# Patient Record
Sex: MALE | URBAN - METROPOLITAN AREA
[De-identification: names, ages, dates, MRNs, and addresses within clinical notes are randomized per-mention and may not be internally consistent; named-entity substitution may affect disease eponyms.]

---

## 2020-06-22 ENCOUNTER — OFFICE VISIT (OUTPATIENT)
Dept: URBAN - METROPOLITAN AREA CLINIC 105 | Facility: CLINIC | Age: 70
End: 2020-06-22
Payer: COMMERCIAL

## 2020-06-22 ENCOUNTER — LAB OUTSIDE AN ENCOUNTER (OUTPATIENT)
Dept: URBAN - METROPOLITAN AREA CLINIC 105 | Facility: CLINIC | Age: 70
End: 2020-06-22

## 2020-06-22 DIAGNOSIS — R10.13 DYSPEPSIA: ICD-10-CM

## 2020-06-22 DIAGNOSIS — Z12.11 SCREEN FOR COLON CANCER: ICD-10-CM

## 2020-06-22 DIAGNOSIS — R63.4 WEIGHT LOSS: ICD-10-CM

## 2020-06-22 DIAGNOSIS — E11.9 TYPE 2 DIABETES MELLITUS WITHOUT COMPLICATION, WITHOUT LONG-TERM CURRENT USE OF INSULIN: ICD-10-CM

## 2020-06-22 PROCEDURE — G9906 PT RECV TBCO CESS INTERV: HCPCS | Performed by: INTERNAL MEDICINE

## 2020-06-22 PROCEDURE — G9902 PT SCRN TBCO AND ID AS USER: HCPCS | Performed by: INTERNAL MEDICINE

## 2020-06-22 PROCEDURE — G8753 SYS BP > OR = 140: HCPCS | Performed by: INTERNAL MEDICINE

## 2020-06-22 PROCEDURE — 3017F COLORECTAL CA SCREEN DOC REV: CPT | Performed by: INTERNAL MEDICINE

## 2020-06-22 PROCEDURE — G8420 CALC BMI NORM PARAMETERS: HCPCS | Performed by: INTERNAL MEDICINE

## 2020-06-22 PROCEDURE — G8427 DOCREV CUR MEDS BY ELIG CLIN: HCPCS | Performed by: INTERNAL MEDICINE

## 2020-06-22 PROCEDURE — 99204 OFFICE O/P NEW MOD 45 MIN: CPT | Performed by: INTERNAL MEDICINE

## 2020-06-22 PROCEDURE — 99244 OFF/OP CNSLTJ NEW/EST MOD 40: CPT | Performed by: INTERNAL MEDICINE

## 2020-06-22 PROCEDURE — 4004F PT TOBACCO SCREEN RCVD TLK: CPT | Performed by: INTERNAL MEDICINE

## 2020-06-22 RX ORDER — LISINOPRIL 5 MG/1
1 TABLET TABLET ORAL ONCE A DAY
Status: ACTIVE | COMMUNITY

## 2020-06-22 RX ORDER — AMLODIPINE BESYLATE 5 MG/1
1 TABLET TABLET ORAL ONCE A DAY
Status: ACTIVE | COMMUNITY

## 2020-06-22 RX ORDER — TAMSULOSIN HYDROCHLORIDE 0.4 MG/1
1 CAPSULE CAPSULE ORAL ONCE A DAY
Status: ACTIVE | COMMUNITY

## 2020-06-22 NOTE — EXAM-PHYSICAL EXAM
Gen: Alert, oriented, in no distress Heent: no icterus, lips wnl Lungs: bilateral breath sounds CVS: first and second heart sounds Abdomen: full, soft, non tender Ext: no edema Joints: normal joints and symmetry Spine: consistent with age Skin: no rash on exposed areas Neuro: no focal localizing signs

## 2020-06-22 NOTE — HPI-TODAY'S VISIT:
69 yo male here for stomach issues. He is here with his brother. Says "I sometimes hear the noise from the upper stomach". "I also want annual check up". He has no abdominal pain, nausea or vomiting.  His brother thinks he has lost 5-10 lbs over the past year.  He says " I eat". Brother "not like before". He does not think his appetite is poor. He has formed stools daily, no constipation, no blood in stool.  He has never had an EGD or colonoscopy.

## 2020-06-23 LAB
A/G RATIO: 2.2
ALBUMIN: 4.4
ALKALINE PHOSPHATASE: 73
ALT (SGPT): 20
AST (SGOT): 18
BASO (ABSOLUTE): 0.1
BASOS: 1
BILIRUBIN, TOTAL: 0.3
BUN/CREATININE RATIO: 20
BUN: 17
CALCIUM: 9.9
CARBON DIOXIDE, TOTAL: 23
CHLORIDE: 102
CREATININE: 0.85
EGFR IF AFRICN AM: 101
EGFR IF NONAFRICN AM: 88
EOS (ABSOLUTE): 0.4
EOS: 5
GLOBULIN, TOTAL: 2
GLUCOSE: 172
HEMATOCRIT: 43.3
HEMATOLOGY COMMENTS:: (no result)
HEMOGLOBIN: 14.1
IMMATURE CELLS: (no result)
IMMATURE GRANS (ABS): 0
IMMATURE GRANULOCYTES: 0
LYMPHS (ABSOLUTE): 1.9
LYMPHS: 26
MCH: 30.9
MCHC: 32.6
MCV: 95
MONOCYTES(ABSOLUTE): 0.7
MONOCYTES: 9
NEUTROPHILS (ABSOLUTE): 4.5
NEUTROPHILS: 59
NRBC: (no result)
PLATELETS: 244
POTASSIUM: 4.2
PROTEIN, TOTAL: 6.4
RBC: 4.57
RDW: 15
SODIUM: 140
WBC: 7.5

## 2020-07-01 ENCOUNTER — TELEPHONE ENCOUNTER (OUTPATIENT)
Dept: URBAN - METROPOLITAN AREA CLINIC 105 | Facility: CLINIC | Age: 70
End: 2020-07-01

## 2020-07-01 ENCOUNTER — OFFICE VISIT (OUTPATIENT)
Dept: URBAN - METROPOLITAN AREA MEDICAL CENTER 33 | Facility: MEDICAL CENTER | Age: 70
End: 2020-07-01
Payer: COMMERCIAL

## 2020-07-01 DIAGNOSIS — K63.89 BACTERIAL OVERGROWTH SYNDROME: ICD-10-CM

## 2020-07-01 DIAGNOSIS — K29.60 ADENOPAPILLOMATOSIS GASTRICA: ICD-10-CM

## 2020-07-01 DIAGNOSIS — Z12.11 COLON CANCER SCREENING: ICD-10-CM

## 2020-07-01 DIAGNOSIS — B96.81 BACTERIAL INFECTION DUE TO H. PYLORI: ICD-10-CM

## 2020-07-01 DIAGNOSIS — D12.5 ADENOMATOUS POLYP OF SIGMOID COLON: ICD-10-CM

## 2020-07-01 DIAGNOSIS — D12.3 ADENOMA OF TRANSVERSE COLON: ICD-10-CM

## 2020-07-01 PROCEDURE — 43239 EGD BIOPSY SINGLE/MULTIPLE: CPT | Performed by: INTERNAL MEDICINE

## 2020-07-01 PROCEDURE — 45385 COLONOSCOPY W/LESION REMOVAL: CPT | Performed by: INTERNAL MEDICINE

## 2020-07-01 PROCEDURE — G9933 CANC DETECTD DURING COL SCRN: HCPCS | Performed by: INTERNAL MEDICINE

## 2020-07-01 RX ORDER — OMEPRAZOLE 40 MG/1
1 CAPSULE 30 MINUTES BEFORE MORNING MEAL CAPSULE, DELAYED RELEASE ORAL ONCE A DAY
Qty: 90 | Refills: 0 | OUTPATIENT
Start: 2020-07-01

## 2020-07-01 RX ORDER — AMLODIPINE BESYLATE 5 MG/1
1 TABLET TABLET ORAL ONCE A DAY
Status: ACTIVE | COMMUNITY

## 2020-07-01 RX ORDER — LISINOPRIL 5 MG/1
1 TABLET TABLET ORAL ONCE A DAY
Status: ACTIVE | COMMUNITY

## 2020-07-01 RX ORDER — TAMSULOSIN HYDROCHLORIDE 0.4 MG/1
1 CAPSULE CAPSULE ORAL ONCE A DAY
Status: ACTIVE | COMMUNITY

## 2020-07-13 ENCOUNTER — OFFICE VISIT (OUTPATIENT)
Dept: URBAN - METROPOLITAN AREA CLINIC 105 | Facility: CLINIC | Age: 70
End: 2020-07-13
Payer: COMMERCIAL

## 2020-07-13 DIAGNOSIS — I10 ESSENTIAL HYPERTENSION: ICD-10-CM

## 2020-07-13 DIAGNOSIS — E11.9 TYPE 2 DIABETES MELLITUS WITHOUT COMPLICATION, WITHOUT LONG-TERM CURRENT USE OF INSULIN: ICD-10-CM

## 2020-07-13 DIAGNOSIS — Z12.11 SCREEN FOR COLON CANCER: ICD-10-CM

## 2020-07-13 DIAGNOSIS — D12.6 COLON ADENOMA: ICD-10-CM

## 2020-07-13 DIAGNOSIS — R63.4 WEIGHT LOSS: ICD-10-CM

## 2020-07-13 DIAGNOSIS — B96.81 HELICOBACTER PYLORI [H. PYLORI] AS THE CAUSE OF DISEASES CLASSIFIED ELSEWHERE: ICD-10-CM

## 2020-07-13 DIAGNOSIS — K27.9 PUD (PEPTIC ULCER DISEASE): ICD-10-CM

## 2020-07-13 DIAGNOSIS — E78.5 HYPERLIPIDEMIA, UNSPECIFIED HYPERLIPIDEMIA TYPE: ICD-10-CM

## 2020-07-13 DIAGNOSIS — K29.70 GASTRITIS, UNSPECIFIED, WITHOUT BLEEDING: ICD-10-CM

## 2020-07-13 DIAGNOSIS — R10.13 DYSPEPSIA: ICD-10-CM

## 2020-07-13 PROCEDURE — G8752 SYS BP LESS 140: HCPCS | Performed by: INTERNAL MEDICINE

## 2020-07-13 PROCEDURE — G8420 CALC BMI NORM PARAMETERS: HCPCS | Performed by: INTERNAL MEDICINE

## 2020-07-13 PROCEDURE — 4004F PT TOBACCO SCREEN RCVD TLK: CPT | Performed by: INTERNAL MEDICINE

## 2020-07-13 PROCEDURE — 3017F COLORECTAL CA SCREEN DOC REV: CPT | Performed by: INTERNAL MEDICINE

## 2020-07-13 PROCEDURE — 99214 OFFICE O/P EST MOD 30 MIN: CPT | Performed by: INTERNAL MEDICINE

## 2020-07-13 PROCEDURE — G8427 DOCREV CUR MEDS BY ELIG CLIN: HCPCS | Performed by: INTERNAL MEDICINE

## 2020-07-13 RX ORDER — AMLODIPINE BESYLATE 5 MG/1
1 TABLET TABLET ORAL ONCE A DAY
Status: ON HOLD | COMMUNITY

## 2020-07-13 RX ORDER — TAMSULOSIN HYDROCHLORIDE 0.4 MG/1
1 CAPSULE CAPSULE ORAL ONCE A DAY
Status: ACTIVE | COMMUNITY

## 2020-07-13 RX ORDER — OMEPRAZOLE 40 MG/1
1 CAPSULE 30 MINUTES BEFORE MORNING MEAL CAPSULE, DELAYED RELEASE ORAL ONCE A DAY
Qty: 90 | Refills: 0 | Status: ACTIVE | COMMUNITY
Start: 2020-07-01

## 2020-07-13 RX ORDER — LISINOPRIL 5 MG/1
1 TABLET TABLET ORAL ONCE A DAY
Status: ACTIVE | COMMUNITY

## 2020-07-13 NOTE — HPI-TODAY'S VISIT:
71 yo male here for stomach issues. He is here with his brother. Says "I sometimes hear the noise from the upper stomach". "I also want annual check up". He has no abdominal pain, nausea or vomiting.  His brother thinks he has lost 5-10 lbs over the past year.  He says " I eat". Brother "not like before". He does not think his appetite is poor. He has formed stools daily, no constipation, no blood in stool.  He has never had an EGD or colonoscopy.   7/13/20 Here with daughter in law who brought him in for his endoscopies. Discussed results of EGD and colonoscopy results and answered questions.  He is feeling much better on Omeprazole daily. He takes ASA daily.

## 2020-10-12 ENCOUNTER — OFFICE VISIT (OUTPATIENT)
Dept: URBAN - METROPOLITAN AREA CLINIC 105 | Facility: CLINIC | Age: 70
End: 2020-10-12
Payer: COMMERCIAL

## 2020-10-12 ENCOUNTER — LAB OUTSIDE AN ENCOUNTER (OUTPATIENT)
Dept: URBAN - METROPOLITAN AREA CLINIC 105 | Facility: CLINIC | Age: 70
End: 2020-10-12

## 2020-10-12 ENCOUNTER — DASHBOARD ENCOUNTERS (OUTPATIENT)
Age: 70
End: 2020-10-12

## 2020-10-12 DIAGNOSIS — I10 ESSENTIAL HYPERTENSION: ICD-10-CM

## 2020-10-12 DIAGNOSIS — R10.13 DYSPEPSIA: ICD-10-CM

## 2020-10-12 DIAGNOSIS — F17.200 SMOKER: ICD-10-CM

## 2020-10-12 DIAGNOSIS — E11.9 TYPE 2 DIABETES MELLITUS WITHOUT COMPLICATION, WITHOUT LONG-TERM CURRENT USE OF INSULIN: ICD-10-CM

## 2020-10-12 DIAGNOSIS — E78.5 HYPERLIPIDEMIA, UNSPECIFIED HYPERLIPIDEMIA TYPE: ICD-10-CM

## 2020-10-12 DIAGNOSIS — K27.9 PUD (PEPTIC ULCER DISEASE): ICD-10-CM

## 2020-10-12 DIAGNOSIS — D12.6 COLON ADENOMA: ICD-10-CM

## 2020-10-12 DIAGNOSIS — R63.4 WEIGHT LOSS: ICD-10-CM

## 2020-10-12 DIAGNOSIS — B96.81 HELICOBACTER PYLORI [H. PYLORI] AS THE CAUSE OF DISEASES CLASSIFIED ELSEWHERE: ICD-10-CM

## 2020-10-12 DIAGNOSIS — K29.70 GASTRITIS, UNSPECIFIED, WITHOUT BLEEDING: ICD-10-CM

## 2020-10-12 PROBLEM — 59621000: Status: ACTIVE | Noted: 2020-06-22

## 2020-10-12 PROBLEM — 313436004: Status: ACTIVE | Noted: 2020-06-22

## 2020-10-12 PROBLEM — 89362005: Status: ACTIVE | Noted: 2020-07-13

## 2020-10-12 PROBLEM — 55822004: Status: ACTIVE | Noted: 2020-06-22

## 2020-10-12 PROBLEM — 89538001: Status: ACTIVE | Noted: 2020-07-13

## 2020-10-12 PROBLEM — 77176002: Status: ACTIVE | Noted: 2020-10-12

## 2020-10-12 PROBLEM — 305058001: Status: ACTIVE | Noted: 2020-06-22

## 2020-10-12 PROBLEM — 6185008: Status: ACTIVE | Noted: 2020-07-13

## 2020-10-12 PROBLEM — 162031009: Status: ACTIVE | Noted: 2020-06-22

## 2020-10-12 PROBLEM — 13200003: Status: ACTIVE | Noted: 2020-07-13

## 2020-10-12 PROCEDURE — 99213 OFFICE O/P EST LOW 20 MIN: CPT | Performed by: INTERNAL MEDICINE

## 2020-10-12 RX ORDER — METFORMIN HYDROCHLORIDE 1000 MG/1
1 TABLET WITH A MEAL TABLET, FILM COATED ORAL ONCE A DAY
Status: ACTIVE | COMMUNITY

## 2020-10-12 RX ORDER — LISINOPRIL 5 MG/1
1 TABLET TABLET ORAL ONCE A DAY
Status: ACTIVE | COMMUNITY

## 2020-10-12 RX ORDER — TAMSULOSIN HYDROCHLORIDE 0.4 MG/1
1 CAPSULE CAPSULE ORAL ONCE A DAY
Status: ACTIVE | COMMUNITY

## 2020-10-12 RX ORDER — AMLODIPINE BESYLATE 5 MG/1
1 TABLET TABLET ORAL ONCE A DAY
Status: DISCONTINUED | COMMUNITY

## 2020-10-12 RX ORDER — OMEPRAZOLE 40 MG/1
1 CAPSULE 30 MINUTES BEFORE MORNING MEAL CAPSULE, DELAYED RELEASE ORAL ONCE A DAY
Qty: 90 | Refills: 0 | Status: ACTIVE | COMMUNITY
Start: 2020-07-01

## 2020-10-12 NOTE — HPI-TODAY'S VISIT:
71 yo male here for stomach issues. He is here with his brother. Says "I sometimes hear the noise from the upper stomach". "I also want annual check up". He has no abdominal pain, nausea or vomiting.  His brother thinks he has lost 5-10 lbs over the past year.  He says " I eat". Brother "not like before". He does not think his appetite is poor. He has formed stools daily, no constipation, no blood in stool.  He has never had an EGD or colonoscopy.   7/13/20 Here with daughter in law who brought him in for his endoscopies. Discussed results of EGD and colonoscopy results and answered questions.  He is feeling much better on Omeprazole daily. He takes ASA daily.   10/12/20 A [employer]  was used. Here with wife. No GI complaints. completed H.pylori therapy. Will need colonoscopy end of year - discussed.  He will continue PPI while  on  ASA. He is gaining weight.

## 2020-10-14 LAB — H PYLORI BREATH TEST: NEGATIVE

## 2021-01-08 ENCOUNTER — OFFICE VISIT (OUTPATIENT)
Dept: URBAN - METROPOLITAN AREA SURGERY CENTER 16 | Facility: SURGERY CENTER | Age: 71
End: 2021-01-08

## 2021-03-26 ENCOUNTER — TELEPHONE ENCOUNTER (OUTPATIENT)
Dept: URBAN - METROPOLITAN AREA CLINIC 92 | Facility: CLINIC | Age: 71
End: 2021-03-26

## 2021-05-18 PROBLEM — 68496003 POLYP COLON: Status: ACTIVE | Noted: 2021-05-18

## 2021-05-18 PROBLEM — 399432003: Status: ACTIVE | Noted: 2020-07-13

## 2021-06-18 ENCOUNTER — OFFICE VISIT (OUTPATIENT)
Dept: URBAN - METROPOLITAN AREA MEDICAL CENTER 33 | Facility: MEDICAL CENTER | Age: 71
End: 2021-06-18
Payer: COMMERCIAL

## 2021-06-18 DIAGNOSIS — Z86.010 H/O ADENOMATOUS POLYP OF COLON: ICD-10-CM

## 2021-06-18 DIAGNOSIS — D12.5 ADENOMA OF SIGMOID COLON: ICD-10-CM

## 2021-06-18 DIAGNOSIS — D12.2 ADENOMA OF ASCENDING COLON: ICD-10-CM

## 2021-06-18 DIAGNOSIS — D12.8 ADENOMATOUS POLYP OF RECTUM: ICD-10-CM

## 2021-06-18 PROCEDURE — 45385 COLONOSCOPY W/LESION REMOVAL: CPT | Performed by: INTERNAL MEDICINE
